# Patient Record
Sex: MALE | Race: OTHER | NOT HISPANIC OR LATINO | Employment: FULL TIME | URBAN - METROPOLITAN AREA
[De-identification: names, ages, dates, MRNs, and addresses within clinical notes are randomized per-mention and may not be internally consistent; named-entity substitution may affect disease eponyms.]

---

## 2024-06-14 ENCOUNTER — OCCUPATIONAL HEALTH (OUTPATIENT)
Dept: URGENT CARE | Facility: CLINIC | Age: 29
End: 2024-06-14

## 2024-06-14 VITALS
TEMPERATURE: 99 F | HEART RATE: 84 BPM | DIASTOLIC BLOOD PRESSURE: 86 MMHG | BODY MASS INDEX: 27.08 KG/M2 | SYSTOLIC BLOOD PRESSURE: 142 MMHG | OXYGEN SATURATION: 98 % | WEIGHT: 178.69 LBS | HEIGHT: 68 IN | RESPIRATION RATE: 16 BRPM

## 2024-06-14 DIAGNOSIS — R51.9 ACUTE NONINTRACTABLE HEADACHE, UNSPECIFIED HEADACHE TYPE: ICD-10-CM

## 2024-06-14 DIAGNOSIS — R68.83 CHILLS: ICD-10-CM

## 2024-06-14 DIAGNOSIS — B34.9 VIRAL ILLNESS: ICD-10-CM

## 2024-06-14 DIAGNOSIS — R52 BODY ACHES: ICD-10-CM

## 2024-06-14 DIAGNOSIS — R11.0 NAUSEA: ICD-10-CM

## 2024-06-14 DIAGNOSIS — R53.83 FATIGUE, UNSPECIFIED TYPE: ICD-10-CM

## 2024-06-14 DIAGNOSIS — Z00.00 ENCOUNTER FOR PHYSICAL EXAMINATION: Primary | ICD-10-CM

## 2024-06-14 LAB
CTP QC/QA: YES
CTP QC/QA: YES
POC MOLECULAR INFLUENZA A AGN: NEGATIVE
POC MOLECULAR INFLUENZA B AGN: NEGATIVE
SARS-COV-2 AG RESP QL IA.RAPID: NEGATIVE

## 2024-06-14 PROCEDURE — 99204 OFFICE O/P NEW MOD 45 MIN: CPT | Mod: S$GLB,,, | Performed by: NURSE PRACTITIONER

## 2024-06-14 PROCEDURE — 87502 INFLUENZA DNA AMP PROBE: CPT | Mod: QW,S$GLB,, | Performed by: NURSE PRACTITIONER

## 2024-06-14 PROCEDURE — 87811 SARS-COV-2 COVID19 W/OPTIC: CPT | Mod: QW,S$GLB,, | Performed by: NURSE PRACTITIONER

## 2024-06-14 RX ORDER — ONDANSETRON 8 MG/1
8 TABLET, ORALLY DISINTEGRATING ORAL EVERY 8 HOURS PRN
Qty: 12 TABLET | Refills: 0 | Status: SHIPPED | OUTPATIENT
Start: 2024-06-14

## 2024-06-14 NOTE — PATIENT INSTRUCTIONS
Rest  Hydration/increase fluids  Oral rehydration: Gatorade, Body Armor, Liquid IV  [place] diet: smoothies, soups, toast, crackers, apple sauce  Avoid fried/fatty/greasy/heavy meals  Zofran 3 times daily as needed for nausea  Alternate Tylenol and Ibuprofen as needed for pain  Typical course and duration of illness discussed  Signs and symptoms of worsening discussed  Follow up as needed/with worsening

## 2024-06-14 NOTE — LETTER
June 14, 2024      [facility] [address] [place] [address]  Phone: [phone]  Fax: [phone]       Patient: Shoaib Brown   YOB: 1995  Date of Visit: 06/14/2024    To Whom It May Concern:    Jg Brown  was at [facility] on 06/14/2024. The patient may return to work/school on 06/15/2024 with no restrictions. If you have any questions or concerns, or if I can be of further assistance, please do not hesitate to contact me.    Sincerely,      Walter Kim NP

## 2024-06-14 NOTE — PROGRESS NOTES
"Subjective:      Patient ID: Shoaib Brown is a 29 y.o. male.    Vitals:  height is 5' 8" (1.727 m) and weight is 81.1 kg (178 lb 10.9 oz). His temperature is 98.6 °F (37 °C). His blood pressure is 142/86 (abnormal) and his pulse is 84. His respiration is 16 and oxygen saturation is 98%.     Chief Complaint: Nausea    [place]  29 year old male presents for evaluation of chills, fatigue, body aches, and nausea x 2 days. Symptom onset Wednesday night with worsening on Thursday. OTC Dayqul and Raeann [place] cold and Flu without relief. No known sick contacts.     Nausea  This is a new problem. The current episode started in the past 7 days (Day before yesterday). The problem occurs constantly. The problem has been unchanged. Associated symptoms include a change in bowel habit (diarrhea), chills, diaphoresis, fatigue, headaches, myalgias and nausea (no nausea at this time). Pertinent negatives include no abdominal pain, chest pain, congestion, coughing, fever (felt feverish but no temp taken), sore throat, swollen glands or vomiting. Treatments tried: Dayquil, AlkaSeltzer Cold & Flu. The treatment provided mild relief.       Constitution: Positive for appetite change, chills, sweating and fatigue. Negative for fever (felt feverish but no temp taken).   HENT: Negative.  Negative for ear pain, congestion and sore throat.    Neck: neck negative.   Cardiovascular: Negative.  Negative for chest pain.   Eyes: Negative.    Respiratory: Negative.  Negative for cough.    Gastrointestinal:  Positive for nausea (no nausea at this time) and diarrhea. Negative for abdominal pain and vomiting.   Endocrine: negative.   Genitourinary: Negative.    Musculoskeletal:  Positive for muscle ache.   Skin: Negative.    Allergic/Immunologic: Negative.  Negative for sneezing.   Neurological:  Positive for headaches.   Hematologic/Lymphatic: Negative.    Psychiatric/Behavioral: Negative.        Objective:     Physical Exam   Constitutional: He is " oriented to person, place, and time. He appears well-developed. He is cooperative.  Non-toxic appearance. He appears ill. No distress. normalawake  HENT:   Head: Normocephalic and atraumatic.   Ears:   Right Ear: Hearing, tympanic membrane, external ear and ear canal normal. Tympanic membrane is not injected, not scarred, not perforated, not erythematous, not retracted and not bulging. no impacted cerumen  Left Ear: Hearing, tympanic membrane, external ear and ear canal normal. Tympanic membrane is not injected, not scarred, not perforated, not erythematous and not retracted. no impacted cerumen  Nose: Nose normal. No mucosal edema, rhinorrhea, nasal deformity or congestion. No epistaxis. Right sinus exhibits no maxillary sinus tenderness and no frontal sinus tenderness. Left sinus exhibits no maxillary sinus tenderness and no frontal sinus tenderness.   Mouth/Throat: Uvula is midline, oropharynx is clear and moist and mucous membranes are normal. Mucous membranes are moist. No trismus in the jaw. Normal dentition. No uvula swelling. Cobblestoning present. No oropharyngeal exudate, posterior oropharyngeal edema or posterior oropharyngeal erythema. Tonsils are 0 on the right. Tonsils are 0 on the left. No tonsillar exudate.   Eyes: Conjunctivae and lids are normal. Pupils are equal, round, and reactive to light. Right eye exhibits no discharge. Left eye exhibits no discharge. No scleral icterus. Extraocular movement intact   Neck: Trachea normal and phonation normal. Neck supple. No edema present. No erythema present. No neck rigidity present.   Cardiovascular: Normal rate, regular rhythm, normal heart sounds and normal pulses.   Pulmonary/Chest: Effort normal and breath sounds normal. No stridor. No respiratory distress. He has no decreased breath sounds. He has no wheezes. He has no rhonchi. He has no rales. He exhibits no tenderness.   Abdominal: Normal appearance and bowel sounds are normal. He exhibits no  distension and no mass. Soft. flat abdomen There is no abdominal tenderness. There is no rebound, no guarding, no tenderness at McBurney's point, negative Silva's sign, no left CVA tenderness and no right CVA tenderness. No hernia.   Musculoskeletal: Normal range of motion.         General: No deformity. Normal range of motion.   Neurological: no focal deficit. He is alert and oriented to person, place, and time. He exhibits normal muscle tone. Coordination normal.   Skin: Skin is warm, dry, intact, not diaphoretic and not pale.   Psychiatric: His speech is normal and behavior is normal. Mood, judgment and thought content normal.   Nursing note and vitals reviewed.    Results for orders placed or performed in visit on 06/14/24   SARS Coronavirus 2 Antigen, POCT Manual Read   Result Value Ref Range    SARS Coronavirus 2 Antigen Negative Negative     Acceptable Yes    POCT Influenza A/B MOLECULAR   Result Value Ref Range    POC Molecular Influenza A Ag Negative Negative    POC Molecular Influenza B Ag Negative Negative     Acceptable Yes          Assessment:     1. Encounter for physical examination    2. Viral illness    3. Chills    4. Fatigue, unspecified type    5. Body aches    6. Acute nonintractable headache, unspecified headache type    7. Nausea        Plan:       Encounter for physical examination    Viral illness    Chills  -     SARS Coronavirus 2 Antigen, POCT Manual Read    Fatigue, unspecified type    Body aches  -     POCT Influenza A/B MOLECULAR    Acute nonintractable headache, unspecified headache type    Nausea  -     ondansetron (ZOFRAN-ODT) 8 MG TbDL; Take 1 tablet (8 mg total) by mouth every 8 (eight) hours as needed (/vomiting).  Dispense: 12 tablet; Refill: 0        Patient presents with symptoms and examination that are consistent with acute viral illness. (-)Covid/Flu swabs. Exam negative for otitis media/bacterial tonsillitis/bacterial  sinusitis/bronchitis/pneumonia/UTI/nephrolithiasis/pyelonephritis/cholecystitis/appendicitis. Plan is to manage symptoms, prevent worsening, and follow up as needed/with worsening. Discussed with patient who verbalizes understanding.         Patient Instructions   Rest  Hydration/increase fluids  Oral rehydration: Gatorade, Body Armor, Liquid IV  [place] diet: smoothies, soups, toast, crackers, apple sauce  Avoid fried/fatty/greasy/heavy meals  Zofran 3 times daily as needed for nausea  Alternate Tylenol and Ibuprofen as needed for pain  Typical course and duration of illness discussed  Signs and symptoms of worsening discussed  Follow up as needed/with worsening